# Patient Record
(demographics unavailable — no encounter records)

---

## 2024-10-29 NOTE — DISCUSSION/SUMMARY
[FreeTextEntry1] : Impression: Family history of breast cancer, fibrocystic breast changes  Recommendations: Self breast exam, mammography annually,  Follow-up in 6 months

## 2024-10-29 NOTE — HISTORY OF PRESENT ILLNESS
[FreeTextEntry1] : Patient is a 47-year-old female presents for 6-month interval breast surveillance visit.  Patient with a significant family history of mother with breast cancer.  Patient has no complaints.  Patient's last mammogram was January 2024.